# Patient Record
Sex: FEMALE | Race: WHITE | NOT HISPANIC OR LATINO | Employment: FULL TIME | URBAN - METROPOLITAN AREA
[De-identification: names, ages, dates, MRNs, and addresses within clinical notes are randomized per-mention and may not be internally consistent; named-entity substitution may affect disease eponyms.]

---

## 2022-01-24 ENCOUNTER — OFFICE VISIT (OUTPATIENT)
Dept: OBSTETRICS AND GYNECOLOGY | Age: 37
End: 2022-01-24

## 2022-01-24 VITALS
SYSTOLIC BLOOD PRESSURE: 112 MMHG | HEIGHT: 69 IN | DIASTOLIC BLOOD PRESSURE: 74 MMHG | WEIGHT: 192.4 LBS | BODY MASS INDEX: 28.5 KG/M2

## 2022-01-24 DIAGNOSIS — N89.8 VAGINAL DISCHARGE: ICD-10-CM

## 2022-01-24 DIAGNOSIS — Z01.419 WELL WOMAN EXAM WITH ROUTINE GYNECOLOGICAL EXAM: Primary | ICD-10-CM

## 2022-01-24 PROCEDURE — 99385 PREV VISIT NEW AGE 18-39: CPT | Performed by: NURSE PRACTITIONER

## 2022-01-24 PROCEDURE — 3008F BODY MASS INDEX DOCD: CPT | Performed by: NURSE PRACTITIONER

## 2022-01-24 PROCEDURE — 99213 OFFICE O/P EST LOW 20 MIN: CPT | Performed by: NURSE PRACTITIONER

## 2022-01-24 RX ORDER — DUPILUMAB 300 MG/2ML
INJECTION, SOLUTION SUBCUTANEOUS
COMMUNITY
Start: 2022-01-14 | End: 2022-03-10

## 2022-01-24 NOTE — PROGRESS NOTES
[facility]  Routine Annual Visit    2022    Patient: Janelle Shultz          MR#:[id number]      History of Present Illness    36 y.o. female  who presents for annual exam as a new patient.    She reports no GYN care in > 5 years.  Previous OBGYN care in [place]   Denies history of abnormal paps.  Using tubal ligation for contraception  Reports normal, monthly cycles  Offered early baseline mammogram- patient declines    Janelle c/o increase in vaginal discharge with associated odor x months and also intermittent sharp, lower abdominal pain x years. The pain is not cyclical. No bowel or bladder symptoms.  She requests comprehensive STI screening today.     Patient's last menstrual period was 2022 (exact date).  Obstetric History:  OB History        5    Para   5    Term           5    AB        Living   5       SAB        IAB        Ectopic        Molar        Multiple        Live Births   5               Menstrual History:     Patient's last menstrual period was 2022 (exact date).       Sexual History:       ________________________________________  There is no problem list on file for this patient.      History reviewed. No pertinent past medical history.    Past Surgical History:   Procedure Laterality Date   •  SECTION         Social History     Tobacco Use   Smoking Status Never Smoker   Smokeless Tobacco Never Used       Family History   Problem Relation Age of Onset   • Breast cancer Maternal Grandmother    • Lung cancer Paternal Grandmother        Prior to Admission medications    Medication Sig Start Date End Date Taking? Authorizing Provider   Dupixent 300 MG/2ML solution pen-injector  22  Yes Provider, MD Yuriy   Loratadine (Claritin) 10 MG capsule Take  by mouth.   Yes Emergency, Nurse FARAZ Barksdale   Dupilumab (DUPIXENT SC) Inject  under the skin into the appropriate area as directed.    Emergency, Nurse Epic, RN   fluconazole (DIFLUCAN)  "150 MG tablet Take 1 tab po once, if symptoms persist taken another tablet in 2 days 7/21/21   Lisbeth Mccormick PA-C     ________________________________________    The following portions of the patient's history were reviewed and updated as appropriate: allergies, current medications, past family history, past medical history, past social history, past surgical history and problem list.    Review of Systems   Constitutional: Negative.    HENT: Negative.    Eyes: Negative for visual disturbance.   Respiratory: Negative for cough, shortness of breath and wheezing.    Cardiovascular: Negative for chest pain, palpitations and leg swelling.   Gastrointestinal: Negative for abdominal distention, abdominal pain, blood in stool, constipation, diarrhea, nausea and vomiting.   Endocrine: Negative for cold intolerance and heat intolerance.   Genitourinary: Negative for difficulty urinating, dyspareunia, dysuria, frequency, genital sores, hematuria, menstrual problem, pelvic pain, urgency, vaginal bleeding, vaginal discharge and vaginal pain.   Musculoskeletal: Negative.    Skin: Negative.    Neurological: Negative for dizziness, weakness, light-headedness, numbness and headaches.   Hematological: Negative.    Psychiatric/Behavioral: Negative.    Breasts: negative for lumps skin changes, dimpling, swelling, nipple changes/discharge bilaterally         Objective   Physical Exam    /74   Ht 175.3 cm (69\")   Wt 87.3 kg (192 lb 6.4 oz)   LMP 01/16/2022 (Exact Date)   Breastfeeding No   BMI 28.41 kg/m²    BP Readings from Last 3 Encounters:   01/24/22 112/74   08/05/21 106/71   07/21/21 109/66      Wt Readings from Last 3 Encounters:   01/24/22 87.3 kg (192 lb 6.4 oz)   08/05/21 83.9 kg (185 lb)   07/21/21 83.9 kg (185 lb)        BMI: Estimated body mass index is 28.41 kg/m² as calculated from the following:    Height as of this encounter: 175.3 cm (69\").    Weight as of this encounter: 87.3 kg (192 lb 6.4 " oz).            General:   alert, appears stated age and cooperative   Heart: regular rate and rhythm, S1, S2 normal, no murmur, click, rub or gallop   Lungs: clear to auscultation bilaterally   Abdomen: soft, non-tender, without masses or organomegaly   Breast: inspection negative, no nipple discharge or bleeding, no masses or nodularity palpable   Vulva: External genitalia including bartholin's glands, Urethra, [place]'s gland and urethra meatus are normal, Perineum, rectum and anus appear normal  and Bladder appears normal without significant prolapse    Vagina: normal mucosa, normal discharge   Cervix: no cervical motion tenderness and no lesions   Uterus: normal size, mobile, non-tender and normal shape and consistency   Adnexa: no mass, fullness, tenderness     Assessment:    Diagnoses and all orders for this visit:    1. Well woman exam with routine gynecological exam (Primary)  -     Hepatitis B Surface Antigen  -     RPR  -     HIV-1 / O / 2 Ag / Antibody 4th Generation  -     Hepatitis C Antibody  -     IgP, Aptima HPV  -     NuSwab VG+ - Swab, Vagina    2. Vaginal discharge  -     NuSwab VG+ - Swab, Vagina      Expect a call with results within 1 week    Schedule T/V US today. Follow up if pain persists and also recommend discussing with PCP    All of the patient's questions were addressed and answered, I have encouraged her to call for today's test results if she has not received them within 10 days.  Patient is advised to call with any change in her condition or with any other questions, otherwise return in 12 months for annual examination.    Cata Luz, APRN  1/24/2022 08:47 EST   no one

## 2022-01-25 LAB
HBV SURFACE AG SERPL QL IA: NEGATIVE
HCV AB S/CO SERPL IA: <0.1 S/CO RATIO (ref 0–0.9)
HIV 1+2 AB+HIV1 P24 AG SERPL QL IA: NON REACTIVE
RPR SER QL: NON REACTIVE

## 2022-01-26 LAB
A VAGINAE DNA VAG QL NAA+PROBE: ABNORMAL SCORE
BVAB2 DNA VAG QL NAA+PROBE: ABNORMAL SCORE
C ALBICANS DNA VAG QL NAA+PROBE: NEGATIVE
C GLABRATA DNA VAG QL NAA+PROBE: NEGATIVE
C TRACH DNA VAG QL NAA+PROBE: NEGATIVE
CYTOLOGIST CVX/VAG CYTO: NORMAL
CYTOLOGY CVX/VAG DOC CYTO: NORMAL
CYTOLOGY CVX/VAG DOC THIN PREP: NORMAL
DX ICD CODE: NORMAL
HIV 1 & 2 AB SER-IMP: NORMAL
HPV I/H RISK 4 DNA CVX QL PROBE+SIG AMP: NEGATIVE
MEGA1 DNA VAG QL NAA+PROBE: ABNORMAL SCORE
N GONORRHOEA DNA VAG QL NAA+PROBE: NEGATIVE
OTHER STN SPEC: NORMAL
STAT OF ADQ CVX/VAG CYTO-IMP: NORMAL
T VAGINALIS DNA VAG QL NAA+PROBE: NEGATIVE

## 2022-01-26 RX ORDER — METRONIDAZOLE 500 MG/1
500 TABLET ORAL 2 TIMES DAILY
Qty: 14 TABLET | Refills: 0 | Status: SHIPPED | OUTPATIENT
Start: 2022-01-26 | End: 2022-02-02

## 2022-01-26 NOTE — PROGRESS NOTES
Please inform patient her pap smear returned negative (normal). Bacterial vaginosis returned positive on her vaginal culture. This is not an STD but a common vaginal infection. Antibiotic sent to pharmacy. Advise pt to avoid alcohol during therapy. Thank you.     Thank you.

## 2022-02-02 PROBLEM — N84.0 ENDOMETRIAL POLYP: Status: ACTIVE | Noted: 2022-02-02

## 2022-02-14 ENCOUNTER — TELEPHONE (OUTPATIENT)
Dept: OBSTETRICS AND GYNECOLOGY | Age: 37
End: 2022-02-14

## 2022-02-14 NOTE — TELEPHONE ENCOUNTER
Called patient and reviewed study and Dr. Alamo's recommendation to repeat in 6 weeks. She understands and appointment is scheduled.

## 2022-06-24 RX ORDER — METRONIDAZOLE 500 MG/1
500 TABLET ORAL 2 TIMES DAILY
Qty: 14 TABLET | Refills: 0 | Status: SHIPPED | OUTPATIENT
Start: 2022-06-24 | End: 2022-07-01

## 2022-12-05 DIAGNOSIS — B96.89 BV (BACTERIAL VAGINOSIS): Primary | ICD-10-CM

## 2022-12-05 DIAGNOSIS — N76.0 BV (BACTERIAL VAGINOSIS): Primary | ICD-10-CM

## 2022-12-05 RX ORDER — METRONIDAZOLE 500 MG/1
500 TABLET ORAL 2 TIMES DAILY
Qty: 14 TABLET | Refills: 0 | Status: SHIPPED | OUTPATIENT
Start: 2022-12-05 | End: 2022-12-12

## 2023-01-25 ENCOUNTER — OFFICE VISIT (OUTPATIENT)
Dept: OBSTETRICS AND GYNECOLOGY | Age: 38
End: 2023-01-25
Payer: COMMERCIAL

## 2023-01-25 VITALS
DIASTOLIC BLOOD PRESSURE: 72 MMHG | SYSTOLIC BLOOD PRESSURE: 112 MMHG | HEIGHT: 69 IN | BODY MASS INDEX: 29.89 KG/M2 | WEIGHT: 201.8 LBS

## 2023-01-25 DIAGNOSIS — Z01.419 WELL WOMAN EXAM WITH ROUTINE GYNECOLOGICAL EXAM: Primary | ICD-10-CM

## 2023-01-25 PROBLEM — N84.0 ENDOMETRIAL POLYP: Status: RESOLVED | Noted: 2022-02-02 | Resolved: 2023-01-25

## 2023-01-25 PROCEDURE — 99395 PREV VISIT EST AGE 18-39: CPT | Performed by: NURSE PRACTITIONER

## 2023-01-25 PROCEDURE — 3008F BODY MASS INDEX DOCD: CPT | Performed by: NURSE PRACTITIONER

## 2023-01-25 PROCEDURE — 2014F MENTAL STATUS ASSESS: CPT | Performed by: NURSE PRACTITIONER

## 2023-01-25 RX ORDER — VENLAFAXINE HYDROCHLORIDE 37.5 MG/1
37.5 CAPSULE, EXTENDED RELEASE ORAL DAILY
COMMUNITY
Start: 2023-01-17 | End: 2024-01-17

## 2023-01-25 RX ORDER — ESCITALOPRAM OXALATE 10 MG/1
10 TABLET ORAL DAILY
COMMUNITY
Start: 2022-11-26

## 2023-01-25 RX ORDER — LORAZEPAM 1 MG/1
TABLET ORAL
COMMUNITY
Start: 2022-11-15

## 2023-01-25 RX ORDER — OMEPRAZOLE 40 MG/1
CAPSULE, DELAYED RELEASE ORAL
COMMUNITY
Start: 2022-10-11

## 2023-01-25 NOTE — PROGRESS NOTES
[facility]  Routine Annual Visit    2023    Patient: Janelle Shultz          MR#:[id number]      History of Present Illness    37 y.o. female  who presents for annual exam without complaint. Periods regular and monthly. Declines early screening mammogram.     Patient's last menstrual period was 2023 (exact date).  Obstetric History:  OB History        5    Para   5    Term           5    AB        Living   5       SAB        IAB        Ectopic        Molar        Multiple        Live Births   5               Menstrual History:     Patient's last menstrual period was 2023 (exact date).       Sexual History:       ________________________________________  Patient Active Problem List   Diagnosis   (none) - all problems resolved or deleted       Past Medical History:   Diagnosis Date   • Endometrial polyp 2022 See US notes.  Possible cystic polyp    • GERD (gastroesophageal reflux disease)        Past Surgical History:   Procedure Laterality Date   •  SECTION     • NASAL POLYP SURGERY         Social History     Tobacco Use   Smoking Status Never   Smokeless Tobacco Never       Family History   Problem Relation Age of Onset   • Asthma Mother    • No Known Problems Father    • Breast cancer Maternal Grandmother    • Lung cancer Paternal Grandmother        Prior to Admission medications    Medication Sig Start Date End Date Taking? Authorizing Provider   betamethasone valerate (VALISONE) 0.1 % cream Apply  topically to the appropriate area as directed. 23  Yes Provider, MD Yuriy   cetirizine (zyrTEC) 10 MG tablet  22  Yes Emergency, Nurse Shamir RN   Dupilumab (DUPIXENT SC) Inject 300 mg under the skin into the appropriate area as directed.   Yes Emergency, Nurse Epic, RN   EPINEPHrine (EPIPEN) 0.3 MG/0.3ML solution auto-injector injection  22  Yes Emergency, Nurse Shamir RN   escitalopram (LEXAPRO) 10 MG tablet Take 10 mg by  mouth Daily. 11/26/22  Yes ProviderYuriy MD   LORazepam (ATIVAN) 1 MG tablet 1/2 to 1 tabs by mouth twice daily as needed for anxiety 11/15/22  Yes ProviderYuriy MD   omeprazole (priLOSEC) 40 MG capsule Take 1 cap by mouth 30 minutes prior to your first main meal. Do not crush or chew. 10/11/22  Yes ProviderYuriy MD   SUMAtriptan (IMITREX) 100 MG tablet TAKE 1 TABLET BY MOUTH AS NEEDED FOR MIGRAINE HEADACHE MAY REPEAT AFTER 2 HOURS. 6/14/22  Yes Emergency, Nurse Shamir, RN   venlafaxine XR (EFFEXOR-XR) 37.5 MG 24 hr capsule Take 37.5 mg by mouth Daily. 1/17/23 1/17/24 Yes ProviderYuriy MD   meclizine (ANTIVERT) 25 MG tablet Take 1 tablet by mouth three times a day prn for 3-7 days; hold Benadryl while taking meclizine 6/14/22   Emergency, Nurse Shamir, RN       The following portions of the patient's history were reviewed and updated as appropriate: allergies, current medications, past family history, past medical history, past social history, past surgical history and problem list.    Review of Systems   Constitutional: Negative.    HENT: Negative.    Eyes: Negative for visual disturbance.   Respiratory: Negative for cough, shortness of breath and wheezing.    Cardiovascular: Negative for chest pain, palpitations and leg swelling.   Gastrointestinal: Negative for abdominal distention, abdominal pain, blood in stool, constipation, diarrhea, nausea and vomiting.   Endocrine: Negative for cold intolerance and heat intolerance.   Genitourinary: Negative for difficulty urinating, dyspareunia, dysuria, frequency, genital sores, hematuria, menstrual problem, pelvic pain, urgency, vaginal bleeding, vaginal discharge and vaginal pain.   Musculoskeletal: Negative.    Skin: Negative.    Neurological: Negative for dizziness, weakness, light-headedness, numbness and headaches.   Hematological: Negative.    Psychiatric/Behavioral: Negative.    Breasts: negative for lumps skin changes, dimpling,  "swelling, nipple changes/discharge bilaterally       Objective   Physical Exam    /72   Ht 175.3 cm (69\")   Wt 91.5 kg (201 lb 12.8 oz)   LMP 01/17/2023 (Exact Date)   BMI 29.80 kg/m²    BP Readings from Last 3 Encounters:   01/25/23 112/72   09/19/22 125/87   03/10/22 125/85      Wt Readings from Last 3 Encounters:   01/25/23 91.5 kg (201 lb 12.8 oz)   09/19/22 90.3 kg (199 lb)   03/10/22 87.1 kg (192 lb)        BMI: Estimated body mass index is 29.8 kg/m² as calculated from the following:    Height as of this encounter: 175.3 cm (69\").    Weight as of this encounter: 91.5 kg (201 lb 12.8 oz).            General:   alert, appears stated age and cooperative   Heart: regular rate and rhythm, S1, S2 normal, no murmur, click, rub or gallop   Lungs: clear to auscultation bilaterally   Abdomen: soft, non-tender, without masses or organomegaly   Breast: inspection negative, no nipple discharge or bleeding, no masses or nodularity palpable   Vulva: External genitalia including bartholin's glands, Urethra, [place]'s gland and urethra meatus are normal, Perineum, rectum and anus appear normal  and Bladder appears normal without significant prolapse    Vagina: normal mucosa, normal discharge   Cervix: no cervical motion tenderness and no lesions   Uterus: normal size, mobile and non-tender   Adnexa: normal adnexa     Assessment:    Diagnoses and all orders for this visit:    1. Well woman exam with routine gynecological exam (Primary)  -     IgP, Aptima HPV      Healthy lifestyle modifications discussed, counseled on self breast exams and bone health    All of the patient's questions were addressed and answered, I have encouraged her to call for today's test results if she has not received them within 10 days.  Patient is advised to call with any change in her condition or with any other questions, otherwise return in 12 months for annual examination.        Cata Luz, APRN  1/25/2023 10:53 EST  "

## 2023-01-30 LAB
CYTOLOGIST CVX/VAG CYTO: NORMAL
CYTOLOGY CVX/VAG DOC CYTO: NORMAL
CYTOLOGY CVX/VAG DOC THIN PREP: NORMAL
DX ICD CODE: NORMAL
HIV 1 & 2 AB SER-IMP: NORMAL
HPV I/H RISK 4 DNA CVX QL PROBE+SIG AMP: NEGATIVE
OTHER STN SPEC: NORMAL
STAT OF ADQ CVX/VAG CYTO-IMP: NORMAL

## 2024-06-02 ENCOUNTER — APPOINTMENT (OUTPATIENT)
Dept: GENERAL RADIOLOGY | Facility: HOSPITAL | Age: 39
End: 2024-06-02
Payer: COMMERCIAL

## 2024-06-02 ENCOUNTER — HOSPITAL ENCOUNTER (EMERGENCY)
Facility: HOSPITAL | Age: 39
Discharge: HOME | End: 2024-06-02
Attending: EMERGENCY MEDICINE | Admitting: EMERGENCY MEDICINE
Payer: COMMERCIAL

## 2024-06-02 VITALS
TEMPERATURE: 97 F | DIASTOLIC BLOOD PRESSURE: 72 MMHG | SYSTOLIC BLOOD PRESSURE: 122 MMHG | HEART RATE: 58 BPM | HEIGHT: 69 IN | WEIGHT: 210 LBS | OXYGEN SATURATION: 97 % | BODY MASS INDEX: 31.1 KG/M2 | RESPIRATION RATE: 18 BRPM

## 2024-06-02 DIAGNOSIS — R07.89 ATYPICAL CHEST PAIN: Primary | ICD-10-CM

## 2024-06-02 LAB
ALBUMIN SERPL-MCNC: 4.3 G/DL (ref 3.5–5.2)
ALBUMIN/GLOB SERPL: 1.8 G/DL
ALP SERPL-CCNC: 61 U/L (ref 39–117)
ALT SERPL W P-5'-P-CCNC: 11 U/L (ref 1–33)
ANION GAP SERPL CALCULATED.3IONS-SCNC: 10.3 MMOL/L (ref 5–15)
AST SERPL-CCNC: 15 U/L (ref 1–32)
BASOPHILS # BLD AUTO: 0.02 10*3/MM3 (ref 0–0.2)
BASOPHILS NFR BLD AUTO: 0.2 % (ref 0–1.5)
BILIRUB SERPL-MCNC: 0.4 MG/DL (ref 0–1.2)
BUN SERPL-MCNC: 9 MG/DL (ref 6–20)
BUN/CREAT SERPL: 14.5 (ref 7–25)
CALCIUM SPEC-SCNC: 9.9 MG/DL (ref 8.6–10.5)
CHLORIDE SERPL-SCNC: 104 MMOL/L (ref 98–107)
CO2 SERPL-SCNC: 23.7 MMOL/L (ref 22–29)
CREAT SERPL-MCNC: 0.62 MG/DL (ref 0.57–1)
D DIMER PPP FEU-MCNC: 0.31 MCGFEU/ML (ref 0–0.5)
DEPRECATED RDW RBC AUTO: 41.7 FL (ref 37–54)
EGFRCR SERPLBLD CKD-EPI 2021: 117.1 ML/MIN/1.73
EOSINOPHIL # BLD AUTO: 0.2 10*3/MM3 (ref 0–0.4)
EOSINOPHIL NFR BLD AUTO: 1.9 % (ref 0.3–6.2)
ERYTHROCYTE [DISTWIDTH] IN BLOOD BY AUTOMATED COUNT: 12.7 % (ref 12.3–15.4)
GEN 5 2HR TROPONIN T REFLEX: 7 NG/L
GLOBULIN UR ELPH-MCNC: 2.4 GM/DL
GLUCOSE SERPL-MCNC: 85 MG/DL (ref 65–99)
HCT VFR BLD AUTO: 41.1 % (ref 34–46.6)
HGB BLD-MCNC: 13.7 G/DL (ref 12–15.9)
HOLD SPECIMEN: NORMAL
HOLD SPECIMEN: NORMAL
IMM GRANULOCYTES # BLD AUTO: 0.01 10*3/MM3 (ref 0–0.05)
IMM GRANULOCYTES NFR BLD AUTO: 0.1 % (ref 0–0.5)
LYMPHOCYTES # BLD AUTO: 3.3 10*3/MM3 (ref 0.7–3.1)
LYMPHOCYTES NFR BLD AUTO: 32 % (ref 19.6–45.3)
MCH RBC QN AUTO: 29.5 PG (ref 26.6–33)
MCHC RBC AUTO-ENTMCNC: 33.3 G/DL (ref 31.5–35.7)
MCV RBC AUTO: 88.4 FL (ref 79–97)
MONOCYTES # BLD AUTO: 0.69 10*3/MM3 (ref 0.1–0.9)
MONOCYTES NFR BLD AUTO: 6.7 % (ref 5–12)
NEUTROPHILS NFR BLD AUTO: 59.1 % (ref 42.7–76)
NEUTROPHILS NFR BLD AUTO: 6.1 10*3/MM3 (ref 1.7–7)
PLATELET # BLD AUTO: 404 10*3/MM3 (ref 140–450)
PMV BLD AUTO: 9.2 FL (ref 6–12)
POTASSIUM SERPL-SCNC: 3.5 MMOL/L (ref 3.5–5.2)
PROT SERPL-MCNC: 6.7 G/DL (ref 6–8.5)
RBC # BLD AUTO: 4.65 10*6/MM3 (ref 3.77–5.28)
SODIUM SERPL-SCNC: 138 MMOL/L (ref 136–145)
TROPONIN T DELTA: 0 NG/L
TROPONIN T SERPL HS-MCNC: 7 NG/L
WBC NRBC COR # BLD AUTO: 10.32 10*3/MM3 (ref 3.4–10.8)
WHOLE BLOOD HOLD COAG: NORMAL
WHOLE BLOOD HOLD SPECIMEN: NORMAL

## 2024-06-02 PROCEDURE — 99284 EMERGENCY DEPT VISIT MOD MDM: CPT

## 2024-06-02 PROCEDURE — 96360 HYDRATION IV INFUSION INIT: CPT

## 2024-06-02 PROCEDURE — 80053 COMPREHEN METABOLIC PANEL: CPT | Performed by: EMERGENCY MEDICINE

## 2024-06-02 PROCEDURE — 85379 FIBRIN DEGRADATION QUANT: CPT | Performed by: EMERGENCY MEDICINE

## 2024-06-02 PROCEDURE — 93005 ELECTROCARDIOGRAM TRACING: CPT | Performed by: EMERGENCY MEDICINE

## 2024-06-02 PROCEDURE — 71045 X-RAY EXAM CHEST 1 VIEW: CPT

## 2024-06-02 PROCEDURE — 36415 COLL VENOUS BLD VENIPUNCTURE: CPT

## 2024-06-02 PROCEDURE — 85025 COMPLETE CBC W/AUTO DIFF WBC: CPT | Performed by: EMERGENCY MEDICINE

## 2024-06-02 PROCEDURE — 99284 EMERGENCY DEPT VISIT MOD MDM: CPT | Performed by: EMERGENCY MEDICINE

## 2024-06-02 PROCEDURE — 25810000003 SODIUM CHLORIDE 0.9 % SOLUTION: Performed by: EMERGENCY MEDICINE

## 2024-06-02 PROCEDURE — 84484 ASSAY OF TROPONIN QUANT: CPT | Performed by: EMERGENCY MEDICINE

## 2024-06-02 RX ORDER — SODIUM CHLORIDE 0.9 % (FLUSH) 0.9 %
10 SYRINGE (ML) INJECTION AS NEEDED
Status: DISCONTINUED | OUTPATIENT
Start: 2024-06-02 | End: 2024-06-02 | Stop reason: HOSPADM

## 2024-06-02 RX ORDER — ASPIRIN 325 MG
325 TABLET ORAL ONCE
Status: COMPLETED | OUTPATIENT
Start: 2024-06-02 | End: 2024-06-02

## 2024-06-02 RX ADMIN — ASPIRIN 325 MG: 325 TABLET ORAL at 13:13

## 2024-06-02 RX ADMIN — SODIUM CHLORIDE 1000 ML: 9 INJECTION, SOLUTION INTRAVENOUS at 13:13

## 2024-06-02 NOTE — FSED PROVIDER NOTE
Subjective   History of Present Illness  37 yo female a  at [employer] was having a typical chest pain that she has off and on and has grown used to began about an hour ago mid to left sided. Later she developed left flank discomfort currently 5/10, greater than the chest discomfort. She also began to have what seems like double vision and tingling LUE which continues in the ER. She doesn't feel herself. No nausea, vomiting, diarrhea, abdominal pain, headache, stiff or sore neck, no rash or difficulty walking, not off balance, no other area of tingling or numbness, no weakness, no change in speech or thinking. Drove herself here. Does not usually eat breakfast; she does drink fluids and coffee in the morning. She feels she could be dehydrated.      Review of Systems    Past Medical History:   Diagnosis Date    Endometrial polyp 2022 See US notes.  Possible cystic polyp     GERD (gastroesophageal reflux disease)     Multiple allergies        Allergies   Allergen Reactions    Escitalopram Other (See Comments)     somnolence       Past Surgical History:   Procedure Laterality Date     SECTION      NASAL POLYP SURGERY         Family History   Problem Relation Age of Onset    Asthma Mother     No Known Problems Father     Breast cancer Maternal Grandmother     Lung cancer Paternal Grandmother        Social History     Socioeconomic History    Marital status: Single   Tobacco Use    Smoking status: Never    Smokeless tobacco: Never   Vaping Use    Vaping status: Every Day    Substances: Nicotine, Flavoring    Devices: Disposable   Substance and Sexual Activity    Alcohol use: Not Currently    Drug use: Never    Sexual activity: Yes     Partners: Male     Birth control/protection: None, Surgical     Comment: Tubal           Objective   Physical Exam  Vitals and nursing note reviewed.   Constitutional:       General: She is not in acute distress.     Appearance: She is well-developed. She is not  ill-appearing, toxic-appearing or diaphoretic.   HENT:      Head: Normocephalic.   Eyes:      Extraocular Movements: Extraocular movements intact.      Pupils: Pupils are equal, round, and reactive to light.   Cardiovascular:      Rate and Rhythm: Normal rate and regular rhythm.      Heart sounds: Normal heart sounds.   Pulmonary:      Effort: Pulmonary effort is normal. No tachypnea or respiratory distress.      Breath sounds: Normal breath sounds. No stridor.   Chest:      Chest wall: No tenderness.   Abdominal:      Palpations: Abdomen is soft.   Musculoskeletal:         General: Normal range of motion.      Cervical back: Normal range of motion and neck supple.   Skin:     General: Skin is dry.      Capillary Refill: Capillary refill takes less than 2 seconds.   Neurological:      General: No focal deficit present.      Mental Status: She is alert and oriented to person, place, and time. She is disoriented.   Psychiatric:         Mood and Affect: Mood normal.         Behavior: Behavior normal.         Procedures           ED Course  ED Course as of 06/02/24 1530   Sun Jun 02, 2024   1351 Troponin #1 is 7 which is normal so no indication MI with the pain that she has been feeling CBC with differential is normal slight elevation of lymphocytes that are absolute suggesting may be a viral component of some kind.  CMP is completely normal D-dimer is normal at 0.3 once no blood clot involvement in this. [AR]   1423    IMPRESSION:  Mild prominence of the pulmonary vascularity suggest  vascular engorgement. No focal airspace disease or evidence for pleural  effusion or pneumothorax.      [AR]   1427 Saba with patient all the results up until the second troponin.  She says she does not have the dizziness or discomfort anymore and feels better.  The fluids going. [AR]   1525 Second troponin 0. [AR]      ED Course User Index  [AR] Nicolasa Santamaria MD                HEART Score: 0                            Medical Decision  Making  Differential diagnosis includes but not limited to MI PE Gerd, esophagitis, aortic dissection, pneumothorax, gastritis, pleurisy, reflux, left back pain ureteral stone    We discussed that all the blood work is normal the chest x-ray is normal and the EKG is normal.  It does not mean what you are feeling is unusual it just I cannot find an emergency today.  If anything changes over the next couple days that concerns you you are quite welcome to return here to be seen.  Otherwise please follow-up with your primary care doctor within a week.  It may be worth discussing with him getting a stress test which is related to cardiac function to verify that everything is okay.    She understood and agreed    Amount and/or Complexity of Data Reviewed  Labs: ordered. Decision-making details documented in ED Course.  Radiology: ordered and independent interpretation performed.     Details: 1 view x-ray normal mediastinum no pneumonia no mass no pleural effusions no pneumothorax no evidence of trauma.  ECG/medicine tests: ordered and independent interpretation performed.     Details: EKG normal sinus rhythm rate 67 1 tiny box depression V4 V5 no STEMI and no old EKG to compare to.    Risk  OTC drugs.  Prescription drug management.        Final diagnoses:   Atypical chest pain       ED Disposition  ED Disposition       ED Disposition   Discharge    Condition   Stable    Comment   --               PATIENT CONNECTION - [facility] [address]  [phone]    If you do not have a primary care doctor you can call this number and they can help you find a physician in the area that has openings for new patients.         Medication List      No changes were made to your prescriptions during this visit.

## 2024-06-02 NOTE — DISCHARGE INSTRUCTIONS
We discussed that all the blood work is normal the chest x-ray is normal and the EKG is normal.  It does not mean what you are feeling is unusual it just I cannot find an emergency today.  If anything changes over the next couple days that concerns you you are quite welcome to return here to be seen.  Otherwise please follow-up with your primary care doctor within a week.  It may be worth discussing with him getting a stress test which is related to cardiac function to verify that everything is okay.

## 2024-06-03 LAB
QT INTERVAL: 404 MS
QTC INTERVAL: 427 MS

## 2025-04-18 ENCOUNTER — HOSPITAL ENCOUNTER (OUTPATIENT)
Facility: HOSPITAL | Age: 40
Discharge: HOME | End: 2025-04-18
Attending: EMERGENCY MEDICINE
Payer: COMMERCIAL

## 2025-04-18 ENCOUNTER — APPOINTMENT (OUTPATIENT)
Dept: GENERAL RADIOLOGY | Facility: HOSPITAL | Age: 40
End: 2025-04-18
Payer: COMMERCIAL

## 2025-04-18 VITALS
OXYGEN SATURATION: 97 % | RESPIRATION RATE: 16 BRPM | TEMPERATURE: 98.1 F | HEART RATE: 77 BPM | HEIGHT: 69 IN | BODY MASS INDEX: 31.1 KG/M2 | DIASTOLIC BLOOD PRESSURE: 84 MMHG | WEIGHT: 210 LBS | SYSTOLIC BLOOD PRESSURE: 147 MMHG

## 2025-04-18 DIAGNOSIS — S93.602A SPRAIN OF LEFT FOOT, INITIAL ENCOUNTER: Primary | ICD-10-CM

## 2025-04-18 PROCEDURE — 73630 X-RAY EXAM OF FOOT: CPT

## 2025-04-18 PROCEDURE — 99213 OFFICE O/P EST LOW 20 MIN: CPT | Performed by: PHYSICIAN ASSISTANT

## 2025-04-18 PROCEDURE — G0463 HOSPITAL OUTPT CLINIC VISIT: HCPCS | Performed by: PHYSICIAN ASSISTANT

## 2025-04-18 RX ORDER — DICLOFENAC SODIUM 75 MG/1
75 TABLET, DELAYED RELEASE ORAL 2 TIMES DAILY
Qty: 14 TABLET | Refills: 0 | Status: SHIPPED | OUTPATIENT
Start: 2025-04-18 | End: 2025-04-25

## 2025-04-18 NOTE — DISCHARGE INSTRUCTIONS
Please take the anti-inflammatory as prescribed.  I have referred you to a podiatrist.  Call the number Monday morning to schedule a close follow-up appointment.  Use the postop shoe as needed to help with the pain.

## 2025-04-18 NOTE — Clinical Note
[facility]  [address]  [place] [address]    Janelle Shultz was seen and treated in our emergency department on 4/18/2025.  She may return to work on 04/19/2025.         Thank you for choosing [facility].    Mickey Sullivan RN

## 2025-04-18 NOTE — FSED PROVIDER NOTE
Subjective   History of Present Illness    Patient reports that she was walking at work 5 days ago ( at [employer]) when she developed pain to the ball of her left foot.  The pain is worse with ambulation and improved with rest.  Currently it is a 4 out of 10 pain scale but with ambulation it is a 9 out of 10 pain scale.  She has tried over-the-counter anti-inflammatories with minimal relief.  Denies a specific injury to that area.    Review of Systems   Constitutional:  Negative for activity change and appetite change.   Eyes:  Negative for pain.   Respiratory:  Negative for shortness of breath.    Gastrointestinal:  Negative for nausea and vomiting.   Musculoskeletal:  Positive for arthralgias.   Skin:  Negative for color change.   Neurological:  Negative for dizziness.   All other systems reviewed and are negative.      Past Medical History:   Diagnosis Date    Endometrial polyp 2022 See US notes.  Possible cystic polyp     GERD (gastroesophageal reflux disease)     Multiple allergies        Allergies   Allergen Reactions    Escitalopram Other (See Comments)     somnolence       Past Surgical History:   Procedure Laterality Date     SECTION      ESOPHAGEAL DILATATION  2024    NASAL POLYP SURGERY         Family History   Problem Relation Age of Onset    Asthma Mother     No Known Problems Father     Breast cancer Maternal Grandmother     Lung cancer Paternal Grandmother        Social History     Socioeconomic History    Marital status: Single   Tobacco Use    Smoking status: Never    Smokeless tobacco: Never   Vaping Use    Vaping status: Every Day    Substances: Nicotine, Flavoring    Devices: Disposable   Substance and Sexual Activity    Alcohol use: Not Currently    Drug use: Never    Sexual activity: Yes     Partners: Male     Birth control/protection: None, Surgical     Comment: Tubal           Objective   Physical Exam  Vitals and nursing note reviewed.   Constitutional:        Appearance: Normal appearance. She is normal weight.   HENT:      Head: Normocephalic and atraumatic.      Nose: Nose normal.      Mouth/Throat:      Mouth: Mucous membranes are moist.   Eyes:      Pupils: Pupils are equal, round, and reactive to light.   Cardiovascular:      Rate and Rhythm: Normal rate and regular rhythm.      Pulses: Normal pulses.      Heart sounds: Normal heart sounds.   Pulmonary:      Effort: Pulmonary effort is normal.      Breath sounds: Normal breath sounds.   Musculoskeletal:         General: Normal range of motion.      Cervical back: Normal range of motion.      Right lower leg: No edema.      Left lower leg: No edema.      Left foot: Tenderness present.   Skin:     General: Skin is warm.   Neurological:      General: No focal deficit present.      Mental Status: She is alert.   Psychiatric:         Behavior: Behavior is cooperative.         Procedures           ED Course                                           Medical Decision Making  Problems Addressed:  Sprain of left foot, initial encounter: complicated acute illness or injury    Amount and/or Complexity of Data Reviewed  Radiology: ordered.    Risk  Prescription drug management.        Final diagnoses:   Sprain of left foot, initial encounter       ED Disposition  ED Disposition       ED Disposition   Discharge    Condition   Stable    Comment   --               Xu Mcnally DPM  [address] [address]  [phone]    Call            Medication List        New Prescriptions      diclofenac 75 MG EC tablet  Commonly known as: VOLTAREN  Take 1 tablet by mouth 2 (Two) Times a Day for 7 days.               Where to Get Your Medications        These medications were sent to [facility] [address] [place] [address] - [address] [facility] - [phone]  - [phone]   [address] [facility] [address]      Phone: [phone]   diclofenac 75 MG EC tablet

## 2025-04-18 NOTE — Clinical Note
[facility]  [address]  [place] [address]    Janelle Shultz was seen and treated in our emergency department on 4/18/2025.  She may return to work on 04/20/2025.         Thank you for choosing [facility].    Nicolasa Santamaria MD

## 2025-04-18 NOTE — Clinical Note
[facility]  [address]  [place] [address]    Janelle Shultz was seen and treated in our emergency department on 4/18/2025.  She may return to work on 04/21/2025.         Thank you for choosing [facility].    Mickey Sullivan RN